# Patient Record
Sex: FEMALE | Race: WHITE | NOT HISPANIC OR LATINO | Employment: OTHER | URBAN - METROPOLITAN AREA
[De-identification: names, ages, dates, MRNs, and addresses within clinical notes are randomized per-mention and may not be internally consistent; named-entity substitution may affect disease eponyms.]

---

## 2020-06-24 ENCOUNTER — RECORDS - HEALTHEAST (OUTPATIENT)
Dept: LAB | Facility: CLINIC | Age: 80
End: 2020-06-24

## 2020-06-28 LAB
SARS-COV-2 BY NAA - HISTORICAL: NOT DETECTED
SARS-COV-2 SOURCE - HISTORICAL: NORMAL

## 2021-06-04 ENCOUNTER — RECORDS - HEALTHEAST (OUTPATIENT)
Dept: LAB | Facility: CLINIC | Age: 81
End: 2021-06-04

## 2021-06-04 LAB
ALBUMIN UR-MCNC: NEGATIVE G/DL
AMORPH CRY #/AREA URNS HPF: ABNORMAL /[HPF]
APPEARANCE UR: CLEAR
BACTERIA #/AREA URNS HPF: ABNORMAL /[HPF]
BILIRUB UR QL STRIP: NEGATIVE
COLOR UR AUTO: YELLOW
GLUCOSE UR STRIP-MCNC: NEGATIVE MG/DL
HGB UR QL STRIP: NEGATIVE
KETONES UR STRIP-MCNC: NEGATIVE MG/DL
LEUKOCYTE ESTERASE UR QL STRIP: ABNORMAL
NITRATE UR QL: NEGATIVE
PH UR STRIP: 6 [PH] (ref 5–8)
RBC URINE: 2 HPF
SP GR UR STRIP: 1.02 (ref 1–1.03)
SQUAMOUS EPITHELIAL: <1 /HPF
UROBILINOGEN UR STRIP-ACNC: ABNORMAL
WBC URINE: 4 HPF

## 2021-06-05 LAB — BACTERIA SPEC CULT: NO GROWTH

## 2022-05-10 ENCOUNTER — LAB REQUISITION (OUTPATIENT)
Dept: LAB | Facility: CLINIC | Age: 82
End: 2022-05-10
Payer: MEDICARE

## 2022-05-10 DIAGNOSIS — U07.1 COVID-19: ICD-10-CM

## 2022-05-11 PROCEDURE — U0005 INFEC AGEN DETEC AMPLI PROBE: HCPCS | Mod: ORL | Performed by: INTERNAL MEDICINE

## 2022-05-12 LAB — SARS-COV-2 RNA RESP QL NAA+PROBE: NEGATIVE

## 2022-05-13 ENCOUNTER — LAB REQUISITION (OUTPATIENT)
Dept: LAB | Facility: CLINIC | Age: 82
End: 2022-05-13
Payer: MEDICARE

## 2022-05-13 DIAGNOSIS — U07.1 COVID-19: ICD-10-CM

## 2022-05-16 PROCEDURE — U0005 INFEC AGEN DETEC AMPLI PROBE: HCPCS | Mod: ORL | Performed by: INTERNAL MEDICINE

## 2022-05-18 LAB — SARS-COV-2 RNA RESP QL NAA+PROBE: NEGATIVE

## 2022-05-20 ENCOUNTER — LAB REQUISITION (OUTPATIENT)
Dept: LAB | Facility: CLINIC | Age: 82
End: 2022-05-20
Payer: MEDICARE

## 2022-05-20 DIAGNOSIS — U07.1 COVID-19: ICD-10-CM

## 2022-05-23 PROCEDURE — U0003 INFECTIOUS AGENT DETECTION BY NUCLEIC ACID (DNA OR RNA); SEVERE ACUTE RESPIRATORY SYNDROME CORONAVIRUS 2 (SARS-COV-2) (CORONAVIRUS DISEASE [COVID-19]), AMPLIFIED PROBE TECHNIQUE, MAKING USE OF HIGH THROUGHPUT TECHNOLOGIES AS DESCRIBED BY CMS-2020-01-R: HCPCS | Mod: ORL | Performed by: INTERNAL MEDICINE

## 2022-05-24 LAB — SARS-COV-2 RNA RESP QL NAA+PROBE: NEGATIVE

## 2022-05-27 ENCOUNTER — LAB REQUISITION (OUTPATIENT)
Dept: LAB | Facility: CLINIC | Age: 82
End: 2022-05-27
Payer: MEDICARE

## 2022-05-27 DIAGNOSIS — U07.1 COVID-19: ICD-10-CM

## 2022-05-31 PROCEDURE — U0003 INFECTIOUS AGENT DETECTION BY NUCLEIC ACID (DNA OR RNA); SEVERE ACUTE RESPIRATORY SYNDROME CORONAVIRUS 2 (SARS-COV-2) (CORONAVIRUS DISEASE [COVID-19]), AMPLIFIED PROBE TECHNIQUE, MAKING USE OF HIGH THROUGHPUT TECHNOLOGIES AS DESCRIBED BY CMS-2020-01-R: HCPCS | Mod: ORL | Performed by: INTERNAL MEDICINE

## 2022-06-01 LAB — SARS-COV-2 RNA RESP QL NAA+PROBE: NEGATIVE

## 2024-01-01 ENCOUNTER — ASSISTED LIVING VISIT (OUTPATIENT)
Dept: GERIATRICS | Facility: CLINIC | Age: 84
End: 2024-01-01
Payer: MEDICARE

## 2024-01-01 ENCOUNTER — TELEPHONE (OUTPATIENT)
Dept: GERIATRICS | Facility: CLINIC | Age: 84
End: 2024-01-01
Payer: MEDICARE

## 2024-01-01 ENCOUNTER — DOCUMENTATION ONLY (OUTPATIENT)
Dept: GERIATRICS | Facility: CLINIC | Age: 84
End: 2024-01-01
Payer: MEDICARE

## 2024-01-01 ENCOUNTER — LAB REQUISITION (OUTPATIENT)
Dept: LAB | Facility: CLINIC | Age: 84
End: 2024-01-01
Payer: MEDICARE

## 2024-01-01 ENCOUNTER — TELEPHONE (OUTPATIENT)
Dept: GERIATRICS | Facility: CLINIC | Age: 84
End: 2024-01-01

## 2024-01-01 ENCOUNTER — DOCUMENTATION ONLY (OUTPATIENT)
Dept: OTHER | Facility: CLINIC | Age: 84
End: 2024-01-01
Payer: MEDICARE

## 2024-01-01 ENCOUNTER — TRANSFERRED RECORDS (OUTPATIENT)
Dept: HEALTH INFORMATION MANAGEMENT | Facility: CLINIC | Age: 84
End: 2024-01-01
Payer: MEDICARE

## 2024-01-01 VITALS
DIASTOLIC BLOOD PRESSURE: 70 MMHG | WEIGHT: 152 LBS | SYSTOLIC BLOOD PRESSURE: 122 MMHG | RESPIRATION RATE: 22 BRPM | OXYGEN SATURATION: 96 % | HEART RATE: 70 BPM | BODY MASS INDEX: 28.7 KG/M2 | HEIGHT: 61 IN | TEMPERATURE: 97.5 F

## 2024-01-01 VITALS
HEIGHT: 61 IN | HEART RATE: 72 BPM | SYSTOLIC BLOOD PRESSURE: 128 MMHG | TEMPERATURE: 98.1 F | WEIGHT: 152 LBS | DIASTOLIC BLOOD PRESSURE: 107 MMHG | RESPIRATION RATE: 20 BRPM | BODY MASS INDEX: 28.7 KG/M2 | OXYGEN SATURATION: 98 %

## 2024-01-01 VITALS
OXYGEN SATURATION: 95 % | WEIGHT: 152 LBS | RESPIRATION RATE: 17 BRPM | TEMPERATURE: 95.3 F | HEIGHT: 61 IN | DIASTOLIC BLOOD PRESSURE: 60 MMHG | BODY MASS INDEX: 28.7 KG/M2 | SYSTOLIC BLOOD PRESSURE: 117 MMHG | HEART RATE: 68 BPM

## 2024-01-01 VITALS — WEIGHT: 152 LBS | HEIGHT: 61 IN | BODY MASS INDEX: 28.7 KG/M2

## 2024-01-01 VITALS
BODY MASS INDEX: 28.7 KG/M2 | HEIGHT: 61 IN | OXYGEN SATURATION: 98 % | HEART RATE: 72 BPM | WEIGHT: 152 LBS | DIASTOLIC BLOOD PRESSURE: 107 MMHG | TEMPERATURE: 98.1 F | RESPIRATION RATE: 20 BRPM | SYSTOLIC BLOOD PRESSURE: 128 MMHG

## 2024-01-01 DIAGNOSIS — F33.1 MAJOR DEPRESSIVE DISORDER, RECURRENT, MODERATE (H): ICD-10-CM

## 2024-01-01 DIAGNOSIS — K59.01 SLOW TRANSIT CONSTIPATION: ICD-10-CM

## 2024-01-01 DIAGNOSIS — E78.2 MIXED HYPERLIPIDEMIA: ICD-10-CM

## 2024-01-01 DIAGNOSIS — D69.6 THROMBOCYTOPENIA (H): ICD-10-CM

## 2024-01-01 DIAGNOSIS — R05.9 COUGH, UNSPECIFIED TYPE: ICD-10-CM

## 2024-01-01 DIAGNOSIS — F03.B4 MODERATE DEMENTIA WITH ANXIETY, UNSPECIFIED DEMENTIA TYPE (H): ICD-10-CM

## 2024-01-01 DIAGNOSIS — N18.31 STAGE 3A CHRONIC KIDNEY DISEASE (H): ICD-10-CM

## 2024-01-01 DIAGNOSIS — R06.2 WHEEZING: ICD-10-CM

## 2024-01-01 DIAGNOSIS — R46.89 PHYSICALLY AGGRESSIVE BEHAVIOR: Primary | ICD-10-CM

## 2024-01-01 DIAGNOSIS — F03.B4 MODERATE DEMENTIA WITH ANXIETY, UNSPECIFIED DEMENTIA TYPE (H): Primary | ICD-10-CM

## 2024-01-01 DIAGNOSIS — R46.89 PHYSICALLY AGGRESSIVE BEHAVIOR: ICD-10-CM

## 2024-01-01 DIAGNOSIS — B37.2 YEAST INFECTION OF THE SKIN: Primary | ICD-10-CM

## 2024-01-01 DIAGNOSIS — E55.9 VITAMIN D DEFICIENCY: ICD-10-CM

## 2024-01-01 DIAGNOSIS — R52 PAIN: Primary | ICD-10-CM

## 2024-01-01 DIAGNOSIS — F91.8 OTHER CONDUCT DISORDERS: ICD-10-CM

## 2024-01-01 DIAGNOSIS — I10 PRIMARY HYPERTENSION: ICD-10-CM

## 2024-01-01 DIAGNOSIS — R42 DIZZINESS AND GIDDINESS: ICD-10-CM

## 2024-01-01 DIAGNOSIS — I48.0 PAROXYSMAL ATRIAL FIBRILLATION (H): ICD-10-CM

## 2024-01-01 DIAGNOSIS — J18.9 PNEUMONIA OF RIGHT LUNG DUE TO INFECTIOUS ORGANISM, UNSPECIFIED PART OF LUNG: Primary | ICD-10-CM

## 2024-01-01 DIAGNOSIS — F03.90 UNSPECIFIED DEMENTIA, UNSPECIFIED SEVERITY, WITHOUT BEHAVIORAL DISTURBANCE, PSYCHOTIC DISTURBANCE, MOOD DISTURBANCE, AND ANXIETY (H): ICD-10-CM

## 2024-01-01 DIAGNOSIS — Z51.81 MEDICATION MONITORING ENCOUNTER: ICD-10-CM

## 2024-01-01 DIAGNOSIS — R11.0 NAUSEA: Primary | ICD-10-CM

## 2024-01-01 DIAGNOSIS — H90.3 SENSORINEURAL HEARING LOSS (SNHL) OF BOTH EARS: ICD-10-CM

## 2024-01-01 LAB
ALBUMIN UR-MCNC: NEGATIVE MG/DL
ANION GAP SERPL CALCULATED.3IONS-SCNC: 14 MMOL/L (ref 7–15)
APPEARANCE UR: CLEAR
BACTERIA UR CULT: NORMAL
BASOPHILS # BLD AUTO: 0 10E3/UL (ref 0–0.2)
BASOPHILS NFR BLD AUTO: 0 %
BILIRUB UR QL STRIP: NEGATIVE
BUN SERPL-MCNC: 15.3 MG/DL (ref 8–23)
CALCIUM SERPL-MCNC: 9.5 MG/DL (ref 8.8–10.2)
CHLORIDE SERPL-SCNC: 106 MMOL/L (ref 98–107)
COLOR UR AUTO: ABNORMAL
CREAT SERPL-MCNC: 1.04 MG/DL (ref 0.51–0.95)
DEPRECATED HCO3 PLAS-SCNC: 18 MMOL/L (ref 22–29)
EGFRCR SERPLBLD CKD-EPI 2021: 53 ML/MIN/1.73M2
EOSINOPHIL # BLD AUTO: 0 10E3/UL (ref 0–0.7)
EOSINOPHIL NFR BLD AUTO: 0 %
ERYTHROCYTE [DISTWIDTH] IN BLOOD BY AUTOMATED COUNT: 14.6 % (ref 10–15)
GLUCOSE SERPL-MCNC: 83 MG/DL (ref 70–99)
GLUCOSE UR STRIP-MCNC: NEGATIVE MG/DL
HCT VFR BLD AUTO: 37.9 % (ref 35–47)
HGB BLD-MCNC: 12.3 G/DL (ref 11.7–15.7)
HGB UR QL STRIP: NEGATIVE
IMM GRANULOCYTES # BLD: 0.1 10E3/UL
IMM GRANULOCYTES NFR BLD: 1 %
KETONES UR STRIP-MCNC: NEGATIVE MG/DL
LEUKOCYTE ESTERASE UR QL STRIP: NEGATIVE
LYMPHOCYTES # BLD AUTO: 2.1 10E3/UL (ref 0.8–5.3)
LYMPHOCYTES NFR BLD AUTO: 32 %
MCH RBC QN AUTO: 28.1 PG (ref 26.5–33)
MCHC RBC AUTO-ENTMCNC: 32.5 G/DL (ref 31.5–36.5)
MCV RBC AUTO: 87 FL (ref 78–100)
MONOCYTES # BLD AUTO: 0.5 10E3/UL (ref 0–1.3)
MONOCYTES NFR BLD AUTO: 8 %
NEUTROPHILS # BLD AUTO: 4 10E3/UL (ref 1.6–8.3)
NEUTROPHILS NFR BLD AUTO: 59 %
NITRATE UR QL: NEGATIVE
NRBC # BLD AUTO: 0 10E3/UL
NRBC BLD AUTO-RTO: 0 /100
PH UR STRIP: 6.5 [PH] (ref 5–7)
PLATELET # BLD AUTO: 195 10E3/UL (ref 150–450)
POTASSIUM SERPL-SCNC: 4.9 MMOL/L (ref 3.4–5.3)
RBC # BLD AUTO: 4.37 10E6/UL (ref 3.8–5.2)
RBC URINE: 1 /HPF
SODIUM SERPL-SCNC: 138 MMOL/L (ref 135–145)
SP GR UR STRIP: 1.01 (ref 1–1.03)
UROBILINOGEN UR STRIP-MCNC: NORMAL MG/DL
WBC # BLD AUTO: 6.7 10E3/UL (ref 4–11)
WBC URINE: 6 /HPF

## 2024-01-01 PROCEDURE — P9603 ONE-WAY ALLOW PRORATED MILES: HCPCS | Mod: ORL | Performed by: NURSE PRACTITIONER

## 2024-01-01 PROCEDURE — 99348 HOME/RES VST EST LOW MDM 30: CPT | Performed by: NURSE PRACTITIONER

## 2024-01-01 PROCEDURE — 99349 HOME/RES VST EST MOD MDM 40: CPT | Performed by: FAMILY MEDICINE

## 2024-01-01 PROCEDURE — 80048 BASIC METABOLIC PNL TOTAL CA: CPT | Mod: ORL | Performed by: NURSE PRACTITIONER

## 2024-01-01 PROCEDURE — 99349 HOME/RES VST EST MOD MDM 40: CPT | Performed by: NURSE PRACTITIONER

## 2024-01-01 PROCEDURE — 81001 URINALYSIS AUTO W/SCOPE: CPT | Mod: ORL | Performed by: NURSE PRACTITIONER

## 2024-01-01 PROCEDURE — 99344 HOME/RES VST NEW MOD MDM 60: CPT | Performed by: NURSE PRACTITIONER

## 2024-01-01 PROCEDURE — 85025 COMPLETE CBC W/AUTO DIFF WBC: CPT | Mod: ORL | Performed by: NURSE PRACTITIONER

## 2024-01-01 PROCEDURE — 87086 URINE CULTURE/COLONY COUNT: CPT | Mod: ORL | Performed by: NURSE PRACTITIONER

## 2024-01-01 PROCEDURE — 36415 COLL VENOUS BLD VENIPUNCTURE: CPT | Mod: ORL | Performed by: NURSE PRACTITIONER

## 2024-01-01 RX ORDER — CALCIUM CARBONATE/VITAMIN D3 600 MG-10
1 TABLET ORAL 2 TIMES DAILY
COMMUNITY
Start: 2023-01-01

## 2024-01-01 RX ORDER — METOPROLOL SUCCINATE 25 MG/1
1 TABLET, EXTENDED RELEASE ORAL DAILY
COMMUNITY
Start: 2023-01-01

## 2024-01-01 RX ORDER — ONDANSETRON 4 MG/1
4 TABLET, ORALLY DISINTEGRATING ORAL EVERY 6 HOURS PRN
COMMUNITY
Start: 2023-03-02 | End: 2024-01-01

## 2024-01-01 RX ORDER — QUETIAPINE FUMARATE 50 MG/1
1 TABLET, FILM COATED ORAL AT BEDTIME
COMMUNITY
Start: 2023-01-01

## 2024-01-01 RX ORDER — QUETIAPINE FUMARATE 25 MG/1
25 TABLET, FILM COATED ORAL EVERY MORNING
Qty: 90 TABLET | Refills: 97 | Status: SHIPPED | OUTPATIENT
Start: 2024-01-01

## 2024-01-01 RX ORDER — NYSTATIN 100000 U/G
CREAM TOPICAL 3 TIMES DAILY
Status: SHIPPED
Start: 2024-01-01 | End: 2024-01-01

## 2024-01-01 RX ORDER — ONDANSETRON 4 MG/1
TABLET, ORALLY DISINTEGRATING ORAL
Qty: 30 TABLET | Refills: 97 | Status: SHIPPED | OUTPATIENT
Start: 2024-01-01

## 2024-01-01 RX ORDER — ESCITALOPRAM OXALATE 20 MG/1
1 TABLET ORAL DAILY
COMMUNITY
Start: 2023-01-01

## 2024-01-01 RX ORDER — ACETAMINOPHEN 500 MG
1000 TABLET ORAL DAILY
COMMUNITY
Start: 2023-01-01

## 2024-01-01 RX ORDER — ALBUTEROL SULFATE 0.83 MG/ML
2.5 SOLUTION RESPIRATORY (INHALATION) EVERY 4 HOURS PRN
COMMUNITY

## 2024-01-01 RX ORDER — ACETAMINOPHEN 325 MG/1
650 TABLET ORAL EVERY 6 HOURS PRN
Qty: 240 TABLET | Refills: 11 | Status: SHIPPED | OUTPATIENT
Start: 2024-01-01

## 2024-01-01 RX ORDER — ACETAMINOPHEN 325 MG/1
650 TABLET ORAL EVERY 6 HOURS PRN
COMMUNITY
End: 2024-01-01

## 2024-01-01 RX ORDER — AMOXICILLIN 250 MG
1 CAPSULE ORAL DAILY PRN
COMMUNITY
Start: 2023-01-01

## 2024-02-06 PROBLEM — F33.1 MAJOR DEPRESSIVE DISORDER, RECURRENT, MODERATE (H): Status: ACTIVE | Noted: 2017-10-16

## 2024-02-06 PROBLEM — D17.9 LIPOMA: Status: ACTIVE | Noted: 2023-05-19

## 2024-02-06 PROBLEM — R41.3 MEMORY LOSS: Status: ACTIVE | Noted: 2019-07-20

## 2024-02-06 PROBLEM — E78.5 HYPERLIPIDEMIA: Status: ACTIVE | Noted: 2018-06-15

## 2024-02-06 PROBLEM — E55.9 VITAMIN D DEFICIENCY: Status: ACTIVE | Noted: 2020-07-16

## 2024-02-06 PROBLEM — H91.90 HARD OF HEARING: Status: ACTIVE | Noted: 2022-08-12
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
## 2024-03-29 NOTE — LETTER
3/29/2024        RE: [facility]  [address]  [place] [address]        No notes on file      Sincerely,        Zeus Valdez MD
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
## 2024-04-12 NOTE — TELEPHONE ENCOUNTER
[facility] Note     Provider: RAUL Chambers CNP   Facility: [facility]  Facility Type:  [place]    Allergies   Allergen Reactions    Oxycodone-Acetaminophen Other (See Comments)     Caused afib    Arrhythmia- atrial fibrillation    Penicillins Unknown     As a child       Labs Reviewed by provider: CXR      Verbal Order/Direction given by Provider: Levaquin 500 mg po today and then 250 mg po daily x 4 more days (total course 5 days). Continue to encourage po fluids and cough syrup.    Provider giving Order:  RAUL Chambers CNP     Verbal Order given to: Samantha Forrest RN
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
## 2024-04-28 PROBLEM — D69.6 THROMBOCYTOPENIA (H): Status: ACTIVE | Noted: 2024-01-01

## 2024-04-28 PROBLEM — R46.89 PHYSICALLY AGGRESSIVE BEHAVIOR: Status: ACTIVE | Noted: 2024-01-01

## 2024-04-28 PROBLEM — F03.B4 MODERATE DEMENTIA WITH ANXIETY, UNSPECIFIED DEMENTIA TYPE (H): Status: ACTIVE | Noted: 2024-01-01
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
## 2024-06-12 NOTE — TELEPHONE ENCOUNTER
[facility] Geriatrics Triage Nurse Telephone Encounter    Provider: RAUL Chambers CNP   Facility: [facility]  Facility Type:  AL    Caller: Allison  Call Back Number: [phone]    Allergies:    Allergies   Allergen Reactions    Oxycodone-Acetaminophen Other (See Comments)     Caused afib    Arrhythmia- atrial fibrillation    Penicillins Unknown     As a child        Reason for call: Pt fell this morning at 6am. Was ambulating at baseline and no apparent injuries at the time. VS stable. Now throughout the day pain has progressed in the left foot/great toe. No swelling, small bruise noted on great toe. Pt is refusing to ambulate now and doesn't want to wear shoes because of the pain.     Verbal Order/Direction given by Provider:   - Rest, Ice and Elevate left foot/toe  - Offer PRN Tylenol for pain  - XR of left foot 3 views for pain    Provider giving Order:  RAUL Guillen CNP    Verbal Order given to: Samantha Mcmullen RN

## 2024-07-02 NOTE — PROGRESS NOTES
[facility] GERIATRICS  Chief Complaint   Patient presents with    alf Regulatory     [place] Medical Record Number:  [id number]  Place of Service where encounter took place [facility] [place] [facility] [[id number]]    HPI:    Fannie Ellsworth  is 83 year old (1940), who is being seen today for a federally mandated E/M visit.     Patient seen today in [facility] for routine check of multiple chronic medical issues:    1. Moderate dementia with anxiety, unspecified dementia type (H)    2. Physically aggressive behavior    3. Major depressive disorder, recurrent, moderate (H)    4. Paroxysmal atrial fibrillation (H)    5. Primary hypertension    6. Stage 3a chronic kidney disease (H)       Nursing reports no new concerns.    VS, weights, NN, medications reviewed today in facility EMR.     Patient found in apartment lying on her bed. Is awake, [place], calm, confused. Appears slight frustrated about visit. Denies any general concerns. Denies pain or SOB.     ALLERGIES:Oxycodone-acetaminophen and Penicillins  PAST MEDICAL HISTORY:   Past Medical History:   Diagnosis Date    A-fib (H) 07/11/2016    CKD (chronic kidney disease) stage 3, GFR 30-59 ml/min (H) 09/16/2016    Formatting of this note might be different from the original.   Noted since September 16, 2016.    Hard of hearing 08/12/2022    HTN (hypertension) 05/11/2011    Hyperlipidemia 06/15/2018    Major depressive disorder, recurrent, moderate (H) 10/16/2017    Memory loss 07/20/2019    Vitamin D deficiency 07/16/2020     PAST SURGICAL HISTORY:   has no past surgical history on file.  FAMILY HISTORY: family history is not on file.  SOCIAL HISTORY:  reports that she quit smoking about 11 years ago. Her smoking use included cigarettes. She started smoking about 64 years ago. She has a 26.5 pack-year smoking history. She has never used smokeless tobacco. She reports that she does not currently use alcohol. She reports that she  does not use drugs.    MEDICATIONS:         Review of your medicines            Accurate as of July 2, 2024 11:59 PM. If you have any questions, ask your nurse or doctor.                CONTINUE these medicines which have NOT CHANGED        Dose / Directions   * acetaminophen 500 MG tablet  Commonly known as: TYLENOL      Dose: 1,000 mg  Take 1,000 mg by mouth daily  Refills: 0     * acetaminophen 325 MG tablet  Commonly known as: TYLENOL  Used for: Pain      Dose: 650 mg  Take 2 tablets (650 mg) by mouth every 6 hours as needed for mild pain  Quantity: 240 tablet  Refills: 11     albuterol (2.5 MG/3ML) 0.083% neb solution  Commonly known as: PROVENTIL      Dose: 2.5 mg  Take 2.5 mg by nebulization every 4 hours as needed for shortness of breath, wheezing or cough  Refills: 0     calcium carbonate-vitamin D 600-10 MG-MCG per tablet  Commonly known as: CALTRATE      Dose: 1 tablet  Take 1 tablet by mouth 2 times daily  Refills: 0     escitalopram 20 MG tablet  Commonly known as: LEXAPRO      Dose: 1 tablet  Take 1 tablet by mouth daily  Refills: 0     metoprolol succinate ER 25 MG 24 hr tablet  Commonly known as: TOPROL XL      Dose: 1 tablet  Take 1 tablet by mouth daily  Refills: 0     omeprazole 20 MG DR capsule  Commonly known as: PriLOSEC      Dose: 20 mg  Take 20 mg by mouth daily  Refills: 0     ondansetron 4 MG ODT tab  Commonly known as: ZOFRAN ODT  Used for: Nausea      DISSOLVE ONE TABLET ON THE TONGUE EVERY 6 HOURS AS NEEDED FOR NAUSEA AND VOMITING  Quantity: 30 tablet  Refills: 97     * QUEtiapine 50 MG tablet  Commonly known as: SEROquel      Dose: 1 tablet  Take 1 tablet by mouth at bedtime  Refills: 0     * QUEtiapine 25 MG tablet  Commonly known as: SEROquel  Used for: Moderate dementia with anxiety, unspecified dementia type (H)      Dose: 25 mg  TAKE 1 TABLET BY MOUTH EVERY MORNING  Quantity: 90 tablet  Refills: 97     senna-docusate 8.6-50 MG tablet  Commonly known as: SENOKOT-S/PERICOLACE       "Dose: 1 tablet  Take 1 tablet by mouth daily as needed  Refills: 0           * This list has 4 medication(s) that are the same as other medications prescribed for you. Read the directions carefully, and ask your doctor or other care provider to review them with you.                     ROS:  Limited secondary to cognitive impairment but today pt reports as above    Vitals:  /60   Pulse 68   Temp (!) 95.3  F (35.2  C)   Resp 17   Ht 1.549 m (5' 1\")   Wt 68.9 kg (152 lb)   SpO2 95%   BMI 28.72 kg/m    Body mass index is 28.72 kg/m .  Exam:    Resp: Effort WNL, LSCTA, RA  CV: VS as above, no noted LE edema  Abd- soft, nontender, BS +  Musc- KAY- lying on bed. Station WNL  Psych- alert, confused, frustrated at times- [facility]      Lab/Diagnostic data:   Most Recent 3 CBC's:  Recent Labs   Lab Test 04/10/24  1557   WBC 6.7   HGB 12.3   MCV 87        Most Recent 3 BMP's:  Recent Labs   Lab Test 04/10/24  1557      POTASSIUM 4.9   CHLORIDE 106   CO2 18*   BUN 15.3   CR 1.04*   ANIONGAP 14   SWAPNA 9.5   GLC 83     Most Recent 2 LFT's:No lab results found.    ASSESSMENT/PLAN     Moderate dementia with anxiety, unspecified dementia type (H)  Physically aggressive behavior  Major depressive disorder, recurrent, moderate (H)  - - chronic, Progressive disease, continued global decline is anticipated. Noted resistance to cares and mood lability, behaviors with intermittent aggression toward staff or other residents. Appeared frustrated today but did look like was attempting to rest in room. Resides in memory care with supportive cares and services.  Tolerating reg diet/thin liquids, weights reviewed- none since February 2024 but per what data is available relatively stable.   - continue on Seroquel 25 mg a.m and 50 mg HS  - continue lexapro  - monitor mood and behaviors closely, supportive approach  -  continue supportive cares and services in [facility], anticipate needs, cue for safety      Paroxysmal atrial " fibrillation (H)  - chronic, rate controlled- VSS. No longer on ACG 2/2 fall/bleed risk.  Primary hypertension  Chronic, controlled  - continue on metoprolol  - monitor VS    Stage 3a chronic kidney disease (H)  - chronic, baseline Cr approx 1.1 - last baseline 4/2024  - avoid nephrotoxic meds  - recheck BMP annually and prn          Electronically signed by:  RAUL Chambers CNP

## 2024-07-02 NOTE — LETTER
7/2/2024      [facility]  [address]  [facility] [address]        [facility] GERIATRICS  Chief Complaint   Patient presents with     [facility] Regulatory     [place] Medical Record Number:  [id number]  Place of Service where encounter took place [facility] [place] [facility] [[id number]]    HPI:    Fannie Ellsworth  is 83 year old (1940), who is being seen today for a federally mandated E/M visit.     Patient seen today in [facility] for routine check of multiple chronic medical issues:    1. Moderate dementia with anxiety, unspecified dementia type (H)    2. Physically aggressive behavior    3. Major depressive disorder, recurrent, moderate (H)    4. Paroxysmal atrial fibrillation (H)    5. Primary hypertension    6. Stage 3a chronic kidney disease (H)       Nursing reports no new concerns.    VS, weights, NN, medications reviewed today in facility EMR.     Patient found in apartment lying on her bed. Is awake, [place], calm, confused. Appears slight frustrated about visit. Denies any general concerns. Denies pain or SOB.     ALLERGIES:Oxycodone-acetaminophen and Penicillins  PAST MEDICAL HISTORY:   Past Medical History:   Diagnosis Date     A-fib (H) 07/11/2016     CKD (chronic kidney disease) stage 3, GFR 30-59 ml/min (H) 09/16/2016    Formatting of this note might be different from the original.   Noted since September 16, 2016.     Hard of hearing 08/12/2022     HTN (hypertension) 05/11/2011     Hyperlipidemia 06/15/2018     Major depressive disorder, recurrent, moderate (H) 10/16/2017     Memory loss 07/20/2019     Vitamin D deficiency 07/16/2020     PAST SURGICAL HISTORY:   has no past surgical history on file.  FAMILY HISTORY: family history is not on file.  SOCIAL HISTORY:  reports that she quit smoking about 11 years ago. Her smoking use included cigarettes. She started smoking about 64 years ago. She has a 26.5 pack-year smoking history.  She has never used smokeless tobacco. She reports that she does not currently use alcohol. She reports that she does not use drugs.    MEDICATIONS:         Review of your medicines            Accurate as of July 2, 2024 11:59 PM. If you have any questions, ask your nurse or doctor.                CONTINUE these medicines which have NOT CHANGED        Dose / Directions   * acetaminophen 500 MG tablet  Commonly known as: TYLENOL      Dose: 1,000 mg  Take 1,000 mg by mouth daily  Refills: 0     * acetaminophen 325 MG tablet  Commonly known as: TYLENOL  Used for: Pain      Dose: 650 mg  Take 2 tablets (650 mg) by mouth every 6 hours as needed for mild pain  Quantity: 240 tablet  Refills: 11     albuterol (2.5 MG/3ML) 0.083% neb solution  Commonly known as: PROVENTIL      Dose: 2.5 mg  Take 2.5 mg by nebulization every 4 hours as needed for shortness of breath, wheezing or cough  Refills: 0     calcium carbonate-vitamin D 600-10 MG-MCG per tablet  Commonly known as: CALTRATE      Dose: 1 tablet  Take 1 tablet by mouth 2 times daily  Refills: 0     escitalopram 20 MG tablet  Commonly known as: LEXAPRO      Dose: 1 tablet  Take 1 tablet by mouth daily  Refills: 0     metoprolol succinate ER 25 MG 24 hr tablet  Commonly known as: TOPROL XL      Dose: 1 tablet  Take 1 tablet by mouth daily  Refills: 0     omeprazole 20 MG DR capsule  Commonly known as: PriLOSEC      Dose: 20 mg  Take 20 mg by mouth daily  Refills: 0     ondansetron 4 MG ODT tab  Commonly known as: ZOFRAN ODT  Used for: Nausea      DISSOLVE ONE TABLET ON THE TONGUE EVERY 6 HOURS AS NEEDED FOR NAUSEA AND VOMITING  Quantity: 30 tablet  Refills: 97     * QUEtiapine 50 MG tablet  Commonly known as: SEROquel      Dose: 1 tablet  Take 1 tablet by mouth at bedtime  Refills: 0     * QUEtiapine 25 MG tablet  Commonly known as: SEROquel  Used for: Moderate dementia with anxiety, unspecified dementia type (H)      Dose: 25 mg  TAKE 1 TABLET BY MOUTH EVERY  "MORNING  Quantity: 90 tablet  Refills: 97     senna-docusate 8.6-50 MG tablet  Commonly known as: SENOKOT-S/PERICOLACE      Dose: 1 tablet  Take 1 tablet by mouth daily as needed  Refills: 0           * This list has 4 medication(s) that are the same as other medications prescribed for you. Read the directions carefully, and ask your doctor or other care provider to review them with you.                     ROS:  Limited secondary to cognitive impairment but today pt reports as above    Vitals:  /60   Pulse 68   Temp (!) 95.3  F (35.2  C)   Resp 17   Ht 1.549 m (5' 1\")   Wt 68.9 kg (152 lb)   SpO2 95%   BMI 28.72 kg/m    Body mass index is 28.72 kg/m .  Exam:    Resp: Effort WNL, LSCTA, RA  CV: VS as above, no noted LE edema  Abd- soft, nontender, BS +  Musc- KAY- lying on bed. Station WNL  Psych- alert, confused, frustrated at times- [place]      Lab/Diagnostic data:   Most Recent 3 CBC's:  Recent Labs   Lab Test 04/10/24  1557   WBC 6.7   HGB 12.3   MCV 87        Most Recent 3 BMP's:  Recent Labs   Lab Test 04/10/24  1557      POTASSIUM 4.9   CHLORIDE 106   CO2 18*   BUN 15.3   CR 1.04*   ANIONGAP 14   SWAPNA 9.5   GLC 83     Most Recent 2 LFT's:No lab results found.    ASSESSMENT/PLAN     Moderate dementia with anxiety, unspecified dementia type (H)  Physically aggressive behavior  Major depressive disorder, recurrent, moderate (H)  - - chronic, Progressive disease, continued global decline is anticipated. Noted resistance to cares and mood lability, behaviors with intermittent aggression toward staff or other residents. Appeared frustrated today but did look like was attempting to rest in room. Resides in memory care with supportive cares and services.  Tolerating reg diet/thin liquids, weights reviewed- none since February 2024 but per what data is available relatively stable.   - continue on Seroquel 25 mg a.m and 50 mg HS  - continue lexapro  - monitor mood and behaviors closely, supportive " approach  -  continue supportive cares and services in [place], anticipate needs, cue for safety      Paroxysmal atrial fibrillation (H)  - chronic, rate controlled- VSS. No longer on ACG 2/2 fall/bleed risk.  Primary hypertension  Chronic, controlled  - continue on metoprolol  - monitor VS    Stage 3a chronic kidney disease (H)  - chronic, baseline Cr approx 1.1 - last baseline 4/2024  - avoid nephrotoxic meds  - recheck BMP annually and prn          Electronically signed by:  RAUL Chambers CNP           Sincerely,        RAUL Chambers CNP

## 2024-07-17 NOTE — TELEPHONE ENCOUNTER
[facility] Triage Nurse Telephone Encounter    Provider: RAUL Chambers CNP   Facility: [facility]  Facility Type:  AL    Caller: Samantha    Allergies:    Allergies   Allergen Reactions    Oxycodone-Acetaminophen Other (See Comments)     Caused afib    Arrhythmia- atrial fibrillation    Penicillins Unknown     As a child        Reason for call: Pt has opened area under right breast along the crease line. Appears dark pink and reports pain. Appears to be moisture related.     Verbal Order/Direction given by Provider:   - Nystatin cream [id number]/GM Apply under rt breast TID for 7 days, then notify provider if not resolving or worsens    Provider giving Order:  RAUL Chambers CNP     Verbal Order given to: Samantha Mcmullen RN